# Patient Record
Sex: FEMALE | URBAN - METROPOLITAN AREA
[De-identification: names, ages, dates, MRNs, and addresses within clinical notes are randomized per-mention and may not be internally consistent; named-entity substitution may affect disease eponyms.]

---

## 2017-07-06 ENCOUNTER — INPATIENT (INPATIENT)
Facility: HOSPITAL | Age: 82
LOS: 4 days | Discharge: HOME | DRG: 56 | End: 2017-07-11
Attending: INTERNAL MEDICINE | Admitting: INTERNAL MEDICINE
Payer: MEDICARE

## 2017-07-06 VITALS
RESPIRATION RATE: 17 BRPM | WEIGHT: 139.99 LBS | SYSTOLIC BLOOD PRESSURE: 121 MMHG | HEIGHT: 64 IN | OXYGEN SATURATION: 97 % | HEART RATE: 86 BPM | TEMPERATURE: 98 F | DIASTOLIC BLOOD PRESSURE: 73 MMHG

## 2017-07-06 PROCEDURE — 70450 CT HEAD/BRAIN W/O DYE: CPT | Mod: 26

## 2017-07-06 PROCEDURE — 71010: CPT | Mod: 26

## 2017-07-06 NOTE — ED PROVIDER NOTE - MEDICAL DECISION MAKING DETAILS
wandering behavior, no trauma or infections, normal exam,  reports pt has wandered several times in past, has applied for aid at home in the past and did not qualify. spoke with Molly from social work team, pt not a safe discharge home. will admit for possible placement vs aid at home.  contact info [phone] Adenike Draper

## 2017-07-06 NOTE — ED PROVIDER NOTE - OBJECTIVE STATEMENT
Physician as . 81 y/o F pt with no known PMHx and no known PSHx BIB Police to ED with AMS s/p being [place] the side of a [place] today. Pt reports R ear discomfort in ED. Pt denies fever, chills, or any other complaints. NKDA.

## 2017-07-06 NOTE — ED PROVIDER NOTE - CONDUCTED A DETAILED DISCUSSION WITH PATIENT AND/OR GUARDIAN REGARDING, MDM
lab results/need for outpatient follow-up/radiology results lab results/radiology results/need to admit

## 2017-07-06 NOTE — ED ADULT TRIAGE NOTE - CHIEF COMPLAINT QUOTE
BIBA patient found wandering on [address] near [facility]. As per EMS [employer] was notified. Patient on 1:1 roam alert and yellow gown until seen by er attending

## 2017-07-07 DIAGNOSIS — G93.40 ENCEPHALOPATHY, UNSPECIFIED: ICD-10-CM

## 2017-07-07 DIAGNOSIS — Z91.83 WANDERING IN DISEASES CLASSIFIED ELSEWHERE: ICD-10-CM

## 2017-07-07 DIAGNOSIS — F03.90 UNSPECIFIED DEMENTIA WITHOUT BEHAVIORAL DISTURBANCE: ICD-10-CM

## 2017-07-07 DIAGNOSIS — Z29.9 ENCOUNTER FOR PROPHYLACTIC MEASURES, UNSPECIFIED: ICD-10-CM

## 2017-07-07 LAB
24R-OH-CALCIDIOL SERPL-MCNC: 47.7 NG/ML — SIGNIFICANT CHANGE UP (ref 30–100)
ALBUMIN SERPL ELPH-MCNC: 3.9 G/DL — SIGNIFICANT CHANGE UP (ref 3.5–5)
ALP SERPL-CCNC: 106 U/L — SIGNIFICANT CHANGE UP (ref 40–120)
ALT FLD-CCNC: 15 U/L DA — SIGNIFICANT CHANGE UP (ref 10–60)
ANION GAP SERPL CALC-SCNC: 8 MMOL/L — SIGNIFICANT CHANGE UP (ref 5–17)
AST SERPL-CCNC: 14 U/L — SIGNIFICANT CHANGE UP (ref 10–40)
BILIRUB SERPL-MCNC: 0.7 MG/DL — SIGNIFICANT CHANGE UP (ref 0.2–1.2)
BUN SERPL-MCNC: 13 MG/DL — SIGNIFICANT CHANGE UP (ref 7–18)
CALCIUM SERPL-MCNC: 8.8 MG/DL — SIGNIFICANT CHANGE UP (ref 8.4–10.5)
CHLORIDE SERPL-SCNC: 104 MMOL/L — SIGNIFICANT CHANGE UP (ref 96–108)
CO2 SERPL-SCNC: 29 MMOL/L — SIGNIFICANT CHANGE UP (ref 22–31)
CREAT SERPL-MCNC: 0.78 MG/DL — SIGNIFICANT CHANGE UP (ref 0.5–1.3)
FOLATE SERPL-MCNC: 10.9 NG/ML — SIGNIFICANT CHANGE UP (ref 4.8–24.2)
GLUCOSE SERPL-MCNC: 93 MG/DL — SIGNIFICANT CHANGE UP (ref 70–99)
HBA1C BLD-MCNC: 5.9 % — HIGH (ref 4–5.6)
HCT VFR BLD CALC: 38.2 % — SIGNIFICANT CHANGE UP (ref 34.5–45)
HGB BLD-MCNC: 12.7 G/DL — SIGNIFICANT CHANGE UP (ref 11.5–15.5)
MCHC RBC-ENTMCNC: 29.9 PG — SIGNIFICANT CHANGE UP (ref 27–34)
MCHC RBC-ENTMCNC: 33.4 GM/DL — SIGNIFICANT CHANGE UP (ref 32–36)
MCV RBC AUTO: 89.5 FL — SIGNIFICANT CHANGE UP (ref 80–100)
PLATELET # BLD AUTO: 260 K/UL — SIGNIFICANT CHANGE UP (ref 150–400)
POTASSIUM SERPL-MCNC: 4.3 MMOL/L — SIGNIFICANT CHANGE UP (ref 3.5–5.3)
POTASSIUM SERPL-SCNC: 4.3 MMOL/L — SIGNIFICANT CHANGE UP (ref 3.5–5.3)
PROT SERPL-MCNC: 7.7 G/DL — SIGNIFICANT CHANGE UP (ref 6–8.3)
RBC # BLD: 4.26 M/UL — SIGNIFICANT CHANGE UP (ref 3.8–5.2)
RBC # FLD: 12.8 % — SIGNIFICANT CHANGE UP (ref 10.3–14.5)
SODIUM SERPL-SCNC: 141 MMOL/L — SIGNIFICANT CHANGE UP (ref 135–145)
TSH SERPL-MCNC: 2.51 UU/ML — SIGNIFICANT CHANGE UP (ref 0.34–4.82)
VIT B12 SERPL-MCNC: 1472 PG/ML — HIGH (ref 243–894)
WBC # BLD: 8.8 K/UL — SIGNIFICANT CHANGE UP (ref 3.8–10.5)
WBC # FLD AUTO: 8.8 K/UL — SIGNIFICANT CHANGE UP (ref 3.8–10.5)

## 2017-07-07 PROCEDURE — 99222 1ST HOSP IP/OBS MODERATE 55: CPT

## 2017-07-07 PROCEDURE — 99284 EMERGENCY DEPT VISIT MOD MDM: CPT

## 2017-07-07 RX ORDER — HALOPERIDOL DECANOATE 100 MG/ML
1 INJECTION INTRAMUSCULAR EVERY 12 HOURS
Qty: 0 | Refills: 0 | Status: DISCONTINUED | OUTPATIENT
Start: 2017-07-07 | End: 2017-07-08

## 2017-07-07 RX ORDER — HALOPERIDOL DECANOATE 100 MG/ML
1 INJECTION INTRAMUSCULAR ONCE
Qty: 0 | Refills: 0 | Status: COMPLETED | OUTPATIENT
Start: 2017-07-07 | End: 2017-07-07

## 2017-07-07 RX ADMIN — Medication 2 MILLIGRAM(S): at 06:55

## 2017-07-07 RX ADMIN — HALOPERIDOL DECANOATE 1 MILLIGRAM(S): 100 INJECTION INTRAMUSCULAR at 05:15

## 2017-07-07 NOTE — H&P ADULT - PROBLEM SELECTOR PLAN 1
patient found wandering on the street  as per ED attending, family was at bedside however on admission, no one at bedside and unable to contact family via phone  AAOx1, not agitated   consult for placement  primary team to get medication list from family  fall risk protocol  dementia work up- tsh, b12, folate patient found wandering on the street  as per ED attending, family was at bedside however on admission, no one at bedside and unable to contact family via phone  AAOx1, not agitated   consult for placement  primary team to get medication list from family  fall risk protocol  dementia work up- tsh, b12, folate  head CT neg  f/u UA patient found wandering on the street  as per ED attending, family was at bedside however on admission, no one at bedside and unable to contact family via phone  AAOx1, not agitated   consult for placement  primary team to get medication list from family  fall risk protocol  dementia work up- tsh, b12, folate  head CT neg  f/u UA  Dr. Wyatt - neuro  Dr. Cutler- psych

## 2017-07-07 NOTE — ED ADULT NURSE NOTE - CHIEF COMPLAINT QUOTE
BIBA patient found wandering on [address] near [facility]. As per EMS [employer] was notified. Patient on 1:1 roam alert and yellow gown until seen by er attending

## 2017-07-07 NOTE — H&P ADULT - NSHPLABSRESULTS_GEN_ALL_CORE
LABS:                        12.7   8.8   )-----------( 260      ( 07 Jul 2017 00:18 )             38.2     07-07    141  |  104  |  13  ----------------------------<  93  4.3   |  29  |  0.78    Ca    8.8      07 Jul 2017 00:19    TPro  7.7  /  Alb  3.9  /  TBili  0.7  /  DBili  x   /  AST  14  /  ALT  15  /  AlkPhos  106  07-07    < from: CT Head No Cont (07.06.17 @ 23:24) >    EXAM:  CT BRAIN                            PROCEDURE DATE:  07/06/2017          INTERPRETATION:  CT HEAD WITHOUT CONTRAST    INDICATION: Confusion.     TECHNIQUE: Noncontrast routine head CT.    COMPARISON: None.    FINDINGS:    Brain: No hemorrhage. No mass effect or edema. No midline shift.   Parenchyma and sulci are age-appropriate.  Ventricles: No evidence of hydrocephalus.    Bones and soft tissues: No acute calvarial fracture.  Sinuses and mastoids: Unremarkable.    IMPRESSION:    No acute intracranial pathology.    < end of copied text >

## 2017-07-07 NOTE — H&P ADULT - HISTORY OF PRESENT ILLNESS
Patient is an 82F with unknown PMH (as per outpatient med rec- unknown pharmacy- has been on several dementia medications) found by the police on the street wandering. As per ED attending, family had come to visit her, however upon admission, no family at bedside. Called spouse Casa and left message, no reply. Patient was seen and examined, AAOx1 only, does not know place, time or year. Denies any pain or discomfort but was frustrated and confused when speaking to . Primary team to follow up regarding patient's home medications for underlying dementia.

## 2017-07-07 NOTE — H&P ADULT - ASSESSMENT
Patient is an 82F with unknown PMH (as per outpatient med rec- unknown pharmacy- has been on several dementia medications) found by the police on the street wandering. Admitted for placement.

## 2017-07-07 NOTE — PROGRESS NOTE ADULT - SUBJECTIVE AND OBJECTIVE BOX
PGY 1 Note discussed with supervising resident and primary attending    Patient is a 82y old  Female who presents with a chief complaint of found wandering in the street (07 Jul 2017 03:17)      INTERVAL HPI/OVERNIGHT EVENTS: Patient was seen at the bedside. patient was sleeping when I went to examine. As per night staffs patient was agitated and was trying to terry house staff. Patient was given halodol which calmed the patient.     MEDICATIONS  (STANDING):    MEDICATIONS  (PRN):  haloperidol    Injectable 1 milliGRAM(s) IV Push every 12 hours PRN agitation      __________________________________________________  REVIEW OF SYSTEMS:    CONSTITUTIONAL: No fever,   EYES: no acute visual disturbances  NECK: No pain or stiffness  RESPIRATORY: No cough; No shortness of breath  CARDIOVASCULAR: No chest pain, no palpitations  GASTROINTESTINAL: No pain. No nausea or vomiting; No diarrhea   NEUROLOGICAL: No headache or numbness, no tremors  MUSCULOSKELETAL: No joint pain, no muscle pain  GENITOURINARY: no dysuria, no frequency, no hesitancy  PSYCHIATRY: no depression , no anxiety  ALL OTHER  ROS negative        Vital Signs Last 24 Hrs  T(C): 36.3 (07 Jul 2017 14:32), Max: 36.8 (06 Jul 2017 21:32)  T(F): 97.3 (07 Jul 2017 14:32), Max: 98.2 (06 Jul 2017 21:32)  HR: 81 (07 Jul 2017 14:32) (76 - 91)  BP: 135/64 (07 Jul 2017 14:32) (121/73 - 175/83)  BP(mean): --  RR: 16 (07 Jul 2017 14:32) (16 - 18)  SpO2: 98% (07 Jul 2017 14:32) (96% - 98%)    ________________________________________________  PHYSICAL EXAM:  GENERAL: NAD  HEENT: Normocephalic;  conjunctivae and sclerae clear; moist mucous membranes;   NECK : supple  CHEST/LUNG: Clear to auscultation bilaterally with good air entry   HEART: S1 S2  regular; no murmurs, gallops or rubs  ABDOMEN: Soft, Nontender, Nondistended; Bowel sounds present  EXTREMITIES: no cyanosis; no edema; no calf tenderness  SKIN: warm and dry; no rash  NERVOUS SYSTEM:  Awake and alert; Oriented  to place, person and time ; no new deficits    _________________________________________________  LABS:                        12.7   8.8   )-----------( 260      ( 07 Jul 2017 00:18 )             38.2     07-07    141  |  104  |  13  ----------------------------<  93  4.3   |  29  |  0.78    Ca    8.8      07 Jul 2017 00:19    TPro  7.7  /  Alb  3.9  /  TBili  0.7  /  DBili  x   /  AST  14  /  ALT  15  /  AlkPhos  106  07-07        CAPILLARY BLOOD GLUCOSE            RADIOLOGY & ADDITIONAL TESTS:    Imaging Personally Reviewed:  YES/NO    Consultant(s) Notes Reviewed:   YES/ No    Care Discussed with Consultants :     Plan of care was discussed with patient and /or primary care giver; all questions and concerns were addressed and care was aligned with patient's wishes.

## 2017-07-07 NOTE — CONSULT NOTE ADULT - ASSESSMENT
chest UA, Utox, RPR Encephalopathy, possible due to dementia with likely superimposed delirium  check UA, Utox, RPR  restart home dementia medications  move by window with curtains open during the day and closed at night with frequent reorientation to minimize delerium  avoid sedating medications  outpatient neuro fu and outpatient evaluation for dementia

## 2017-07-07 NOTE — PROGRESS NOTE ADULT - SUBJECTIVE AND OBJECTIVE BOX
CHIEF COMPLAINT:Patient is a 82y old  Female who presents with a chief complaint of found wandering in the street (07 Jul 2017 03:17)  h/o severe dementia was [place]. 	          REVIEW OF SYSTEMS:  CONSTITUTIONAL: No fever, weight loss, or fatigue  EYES: No eye pain, visual disturbances, or discharge  NECK: No pain or stiffness  RESPIRATORY: No cough, wheezing, chills or hemoptysis; No Shortness of Breath  CARDIOVASCULAR: No chest pain, palpitations, passing out, dizziness, or leg swelling  GASTROINTESTINAL: No abdominal or epigastric pain. No nausea, vomiting, or hematemesis; No diarrhea or constipation. No melena or hematochezia.  GENITOURINARY: No dysuria, frequency, hematuria, or incontinence  NEUROLOGICAL: No headaches, memory loss, loss of strength, numbness, or tremors  SKIN: No itching, burning, rashes, or lesions   LYMPH Nodes: No enlarged glands  ENDOCRINE: No heat or cold intolerance; No hair loss  MUSCULOSKELETAL: No joint pain or swelling; No muscle, back, or extremity pain    Medications:  MEDICATIONS  (STANDING):    MEDICATIONS  (PRN):  haloperidol    Injectable 1 milliGRAM(s) IV Push every 12 hours PRN agitation    	    PHYSICAL EXAM:  T(C): 36.3 (07-07-17 @ 14:32), Max: 36.8 (07-06-17 @ 21:32)  HR: 81 (07-07-17 @ 14:32) (76 - 91)  BP: 135/64 (07-07-17 @ 14:32) (121/73 - 175/83)  RR: 16 (07-07-17 @ 14:32) (16 - 18)  SpO2: 98% (07-07-17 @ 14:32) (96% - 98%)  Wt(kg): --  I&O's Summary      Appearance: Normal	  HEENT:   Normal oral mucosa, PERRL, EOMI	  Lymphatic: No lymphadenopathy  Cardiovascular: Normal S1 S2, No JVD, No murmurs, No edema  Respiratory: Lungs clear to auscultation	  Psychiatry: A & O x 3, Mood & affect appropriate  Gastrointestinal:  Soft, Non-tender, + BS	  Skin: No rashes, No ecchymoses, No cyanosis	  Neurologic: Non-focal  Extremities: Normal range of motion, No clubbing, cyanosis or edema  Vascular: Peripheral pulses palpable 2+ bilaterally    TELEMETRY: 	    ECG:  	  RADIOLOGY:  OTHER: 	  	  LABS:	 	    CARDIAC MARKERS:                                12.7   8.8   )-----------( 260      ( 07 Jul 2017 00:18 )             38.2     07-07    141  |  104  |  13  ----------------------------<  93  4.3   |  29  |  0.78    Ca    8.8      07 Jul 2017 00:19    TPro  7.7  /  Alb  3.9  /  TBili  0.7  /  DBili  x   /  AST  14  /  ALT  15  /  AlkPhos  106  07-07    proBNP:   Lipid Profile:   HgA1c: Hemoglobin A1C, Whole Blood: 5.9 % (07-07 @ 09:09)    TSH: Thyroid Stimulating Hormone, Serum: 2.51 uU/mL (07-07 @ 06:41)

## 2017-07-07 NOTE — CONSULT NOTE ADULT - SUBJECTIVE AND OBJECTIVE BOX
Patient is a 82y old  Female who presents with a chief complaint of found wandering in the street (07 Jul 2017 03:17)      HPI:  Patient is an 82F with unknown PMH (as per outpatient med rec- unknown pharmacy- has been on several dementia medications) found by the police on the street wandering. As per ED attending, family had come to visit her, however upon admission, no family at bedside. Called spouse Casa and left message, no reply. Patient was seen and examined, AAOx1 only, does not know place, time or year. Denies any pain or discomfort but was frustrated and confused when speaking to . Primary team to follow up regarding patient's home medications for underlying dementia. (07 Jul 2017 03:17)         Neurological Review of Systems:  No difficulty with language.  No vision loss or double vision.  No dizziness, vertigo or new hearing loss.  No difficulty with speech or swallowing.  No focal weakness.  No focal sensory changes.  No numbness or tingling in the bilateral lower extremities.  No difficulty with balance.  No difficulty with ambulation.        MEDICATIONS  (STANDING):    MEDICATIONS  (PRN):  haloperidol    Injectable 1 milliGRAM(s) IV Push every 12 hours PRN agitation    Allergies    No Known Allergies    Intolerances      PAST MEDICAL & SURGICAL HISTORY:  No pertinent past medical history  No significant past surgical history    FAMILY HISTORY:  No pertinent family history in first degree relatives    SOCIAL HISTORY: non smoker/ former smoker/ active smoker    Review of Systems:  Constitutional: No generalized weakness. No fevers or chills.                    Eyes, Ears, Mouth, Throat: No vision loss   Respiratory: No shortness of breath or cough.                                Cardiovascular: No chest pain or palpitations  Gastrointestinal: No nausea or vomiting.                                         Genitourinary: No urinary incontinence or burning on urination.  Musculoskeletal: No joint pain.                                                           Dermatologic: No rash.  Neurological: as per HPI                                                                      Psychiatric: No behavioral problems.  Endocrine: No known hypoglycemia.               Hematologic/Lymphatic: No easy bleeding.    O:  Vital Signs Last 24 Hrs  T(C): 36.3 (07 Jul 2017 14:32), Max: 36.8 (06 Jul 2017 21:32)  T(F): 97.3 (07 Jul 2017 14:32), Max: 98.2 (06 Jul 2017 21:32)  HR: 81 (07 Jul 2017 14:32) (76 - 91)  BP: 135/64 (07 Jul 2017 14:32) (121/73 - 175/83)  BP(mean): --  RR: 16 (07 Jul 2017 14:32) (16 - 18)  SpO2: 98% (07 Jul 2017 14:32) (96% - 98%)    General Exam:   General appearance: No acute distress                 Cardiovascular: Pedal dorsalis pulses intact bilaterally    Mental Status: Orientated to self, date and place.  Attention intact.  No dysarthria, aphasia or neglect.  Knowledge intact.  Registration intact.  Short and long term memory grossly intact.      Cranial Nerves: CN I - not tested.  PERRL, EOMI, VFF, no nystagmus or diplopia.  No APD.  Fundi not visualized.  CN V1-3 intact to light touch and pinprick.  No facial asymmetry.  Hearing intact to finger rub bilaterally.  Tongue, uvula and palate midline.  Sternocleidomastoid and Trapezius intact bilaterally.    Motor:   Tone: normal.                  Strength intact throughout  No pronator drift bilaterally                      No dysmetria on finger-nose-finger or heel-shin-heel  No truncal ataxia.  No resting, postural or action tremor.  No myoclonus.    Sensation: intact to light touch, pinprick, vibration and proprioception    Deep Tendon Reflexes: 1+ bilateral biceps, triceps, brachioradialis, knee and ankle  Toes flexor bilaterally    Gait: normal and stable.  Rhomberg -felipa.    Other:     LABS:                        12.7   8.8   )-----------( 260      ( 07 Jul 2017 00:18 )             38.2     07-07    141  |  104  |  13  ----------------------------<  93  4.3   |  29  |  0.78    Ca    8.8      07 Jul 2017 00:19    TPro  7.7  /  Alb  3.9  /  TBili  0.7  /  DBili  x   /  AST  14  /  ALT  15  /  AlkPhos  106  07-07      Vitamin B12, Serum (07.07.17 @ 09:06)    Vitamin B12, Serum: 1472 pg/mL    Folate, Serum (07.07.17 @ 09:06)    Folate, Serum: 10.9 ng/mL            RADIOLOGY & ADDITIONAL STUDIES:    Hemoglobin A1C, Whole Blood (07.07.17 @ 09:09)    Hemoglobin A1C, Whole Blood: 5.9: Method: Immunoassay       Reference Range                4.0-5.6%       High risk (prediabetic)        5.7-6.4%       Diabetic, diagnostic             >=6.5%       ADA diabetic treatment goal       <7.0%  The Hemoglobin A1c reference ranges are based5.9: on the 2010 recommendations  Thyroid Stimulating Hormone, Serum (07.07.17 @ 06:41)    Thyroid Stimulating Hormone, Serum: 2.51 uU/mL        EKG:    CTH (images reviewed) < from: CT Head No Cont (07.06.17 @ 23:24) >    FINDINGS:    Brain: No hemorrhage. No mass effect or edema. No midline shift.   Parenchyma and sulci are age-appropriate.  Ventricles: No evidence of hydrocephalus.    Bones and soft tissues: No acute calvarial fracture.  Sinuses and mastoids: Unremarkable.    IMPRESSION:    No acute intracranial pathology.    < end of copied text >    < from: Xray Chest 1 View AP-PORTABLE IMMEDIATE (07.06.17 @ 22:50) >  NTERPRETATION:  Portable chest x-ray    Clinical Indication: Confusion. screening for infection    Comparison: None    There is no acute pulmonary infiltrate, pleural effusion, or   pneumothorax. The trachea is midline. The cardiac silhouette is within   normal limits. No pulmonary vascular congestion.    Impression: No radiographic evidence for acute cardiopulmonary disease.    < end of copied text > Patient unable to give history, information obtained from the chart and aid at bedside.    HPI:  Patient is an 82F with unknown PMH (as per outpatient med rec- unknown pharmacy- has been on several dementia medications) found by the police on the street wandering. The patient is unable to give history and further history is currently non able to be obtained.  The patient was agitated earlier as per aid at bedside and received sedating medications.      MEDICATIONS  (STANDING):    MEDICATIONS  (PRN):  haloperidol    Injectable 1 milliGRAM(s) IV Push every 12 hours PRN agitation    Allergies    No Known Allergies    Intolerances      PAST MEDICAL & SURGICAL HISTORY:  No pertinent past medical history  No significant past surgical history    FAMILY HISTORY:  No pertinent family history in first degree relatives    SOCIAL HISTORY: smoking status unknown    Review of Systems:  Constitutional:No fevers                 Eyes, Ears, Mouth, Throat: unable to obtain   Respiratory: No cough.                                Cardiovascular:  unable to obtain   Gastrointestinal: No vomiting.                                         Genitourinary:  unable to obtain   Musculoskeletal:  unable to obtain                                                       Dermatologic: No rash.  Neurological: as per HPI                                                                      Psychiatric: + behavioral problems.  Endocrine: No hypoglycemia in hospital        Hematologic/Lymphatic:  unable to obtain     O:  Vital Signs Last 24 Hrs  T(C): 36.3 (07 Jul 2017 14:32), Max: 36.8 (06 Jul 2017 21:32)  T(F): 97.3 (07 Jul 2017 14:32), Max: 98.2 (06 Jul 2017 21:32)  HR: 81 (07 Jul 2017 14:32) (76 - 91)  BP: 135/64 (07 Jul 2017 14:32) (121/73 - 175/83)  BP(mean): --  RR: 16 (07 Jul 2017 14:32) (16 - 18)  SpO2: 98% (07 Jul 2017 14:32) (96% - 98%)    General Exam:   General appearance: No acute distress                 Cardiovascular: Pedal dorsalis pulses intact bilaterally    Mental Status: Orientated to self but  not to date and place.  Attention decreased.  dysarthria, aphasia or neglect unrleibale.  Patient follows only 1 request, show me your tongue.  Knowledge, Registration unrleibale.  Short and long term memory:  unrelible.    Cranial Nerves: CN I - not tested.  PERRL, EOMI, blinks  to  threat bl temporal area.  Fundi not visualized.  CN V1-3 unreliable.  No facial asymmetry.  Hearing intact to speech bilaterally.  Tongue, uvula and palate midline.  Sternocleidomastoid and Trapezius grossly intact.    Motor:   tone: normal  moved all 4 ext equally  Unable to asses for dysmetria as patient not following requests.  No truncal ataxia.  No resting, postural or action tremor.  No myoclonus.    Sensation: intact to pain all 4 exr    Deep Tendon Reflexes: 1+ bilateral biceps, triceps, brachioradialis, knee and ankle  Toes flexor bilaterally    Gait: patient unsafe to walk    Other:     LABS:                        12.7   8.8   )-----------( 260      ( 07 Jul 2017 00:18 )             38.2     07-07    141  |  104  |  13  ----------------------------<  93  4.3   |  29  |  0.78    Ca    8.8      07 Jul 2017 00:19    TPro  7.7  /  Alb  3.9  /  TBili  0.7  /  DBili  x   /  AST  14  /  ALT  15  /  AlkPhos  106  07-07      Vitamin B12, Serum (07.07.17 @ 09:06)    Vitamin B12, Serum: 1472 pg/mL    Folate, Serum (07.07.17 @ 09:06)    Folate, Serum: 10.9 ng/mL            RADIOLOGY & ADDITIONAL STUDIES:    Hemoglobin A1C, Whole Blood (07.07.17 @ 09:09)    Hemoglobin A1C, Whole Blood: 5.9: Method: Immunoassay       Reference Range                4.0-5.6%       High risk (prediabetic)        5.7-6.4%       Diabetic, diagnostic             >=6.5%       ADA diabetic treatment goal       <7.0%  The Hemoglobin A1c reference ranges are based5.9: on the 2010 recommendations  Thyroid Stimulating Hormone, Serum (07.07.17 @ 06:41)    Thyroid Stimulating Hormone, Serum: 2.51 uU/mL        EKG:    CTH (images reviewed) < from: CT Head No Cont (07.06.17 @ 23:24) >    FINDINGS:    Brain: No hemorrhage. No mass effect or edema. No midline shift.   Parenchyma and sulci are age-appropriate.  Ventricles: No evidence of hydrocephalus.    Bones and soft tissues: No acute calvarial fracture.  Sinuses and mastoids: Unremarkable.    IMPRESSION:    No acute intracranial pathology.    < end of copied text >    < from: Xray Chest 1 View AP-PORTABLE IMMEDIATE (07.06.17 @ 22:50) >  NTERPRETATION:  Portable chest x-ray    Clinical Indication: Confusion. screening for infection    Comparison: None    There is no acute pulmonary infiltrate, pleural effusion, or   pneumothorax. The trachea is midline. The cardiac silhouette is within   normal limits. No pulmonary vascular congestion.    Impression: No radiographic evidence for acute cardiopulmonary disease.    < end of copied text > Patient unable to give history, information obtained from the chart and aid at bedside.    HPI:  Patient is an 82F with unknown PMH (as per outpatient med rec- unknown pharmacy- has been on several dementia medications) found by the police on the street wandering. The patient is unable to give history and further history is currently non able to be obtained.  The patient was agitated earlier as per aid at bedside and received sedating medications.      MEDICATIONS  (STANDING):    MEDICATIONS  (PRN):  haloperidol    Injectable 1 milliGRAM(s) IV Push every 12 hours PRN agitation    Allergies    No Known Allergies    Intolerances      PAST MEDICAL & SURGICAL HISTORY:  No pertinent past medical history  No significant past surgical history    FAMILY HISTORY:  No pertinent family history in first degree relatives    SOCIAL HISTORY: smoking status unknown    Review of Systems:  Constitutional:No fevers                 Eyes, Ears, Mouth, Throat: unable to obtain   Respiratory: No cough.                                Cardiovascular:  unable to obtain   Gastrointestinal: No vomiting.                                         Genitourinary:  unable to obtain   Musculoskeletal:  unable to obtain                                                       Dermatologic: No rash.  Neurological: as per HPI                                                                      Psychiatric: + behavioral problems.  Endocrine: No hypoglycemia in hospital        Hematologic/Lymphatic:  unable to obtain     O:  Vital Signs Last 24 Hrs  T(C): 36.3 (07 Jul 2017 14:32), Max: 36.8 (06 Jul 2017 21:32)  T(F): 97.3 (07 Jul 2017 14:32), Max: 98.2 (06 Jul 2017 21:32)  HR: 81 (07 Jul 2017 14:32) (76 - 91)  BP: 135/64 (07 Jul 2017 14:32) (121/73 - 175/83)  BP(mean): --  RR: 16 (07 Jul 2017 14:32) (16 - 18)  SpO2: 98% (07 Jul 2017 14:32) (96% - 98%)    General Exam:   General appearance: No acute distress                 Cardiovascular: Pedal dorsalis pulses intact bilaterally    Mental Status: Orientated to self but  not to date and place.  Attention decreased.  dysarthria, aphasia or neglect unrleibale.  Patient follows only 1 request, show me your tongue.  Knowledge, Registration unrleibale.  Short and long term memory:  unrelible.    Cranial Nerves: CN I - not tested.  PERRL, EOMI, blinks  to  threat bl temporal area.  Fundi not visualized.  CN V1-3 unreliable.  No facial asymmetry.  Hearing intact to speech bilaterally.  Tongue, uvula and palate midline.  Sternocleidomastoid and Trapezius grossly intact.    Motor:   tone: normal  moved all 4 ext equally  Unable to asses for dysmetria as patient not following requests.  No truncal ataxia.  No resting, postural or action tremor.  No myoclonus.    Sensation: intact to pain all 4 exr    Deep Tendon Reflexes: 1+ bilateral biceps, triceps, brachioradialis, knee and ankle  Toes flexor bilaterally    Gait: patient unsafe to walk    Other:     LABS:                        12.7   8.8   )-----------( 260      ( 07 Jul 2017 00:18 )             38.2     07-07    141  |  104  |  13  ----------------------------<  93  4.3   |  29  |  0.78    Ca    8.8      07 Jul 2017 00:19    TPro  7.7  /  Alb  3.9  /  TBili  0.7  /  DBili  x   /  AST  14  /  ALT  15  /  AlkPhos  106  07-07      Vitamin B12, Serum (07.07.17 @ 09:06)    Vitamin B12, Serum: 1472 pg/mL    Folate, Serum (07.07.17 @ 09:06)    Folate, Serum: 10.9 ng/mL            RADIOLOGY & ADDITIONAL STUDIES:    Hemoglobin A1C, Whole Blood (07.07.17 @ 09:09)    Hemoglobin A1C, Whole Blood: 5.9: Method: Immunoassay       Reference Range                4.0-5.6%       High risk (prediabetic)        5.7-6.4%       Diabetic, diagnostic             >=6.5%       ADA diabetic treatment goal       <7.0%  The Hemoglobin A1c reference ranges are based5.9: on the 2010 recommendations  Thyroid Stimulating Hormone, Serum (07.07.17 @ 06:41)    Thyroid Stimulating Hormone, Serum: 2.51 uU/mL      CTH (images reviewed) < from: CT Head No Cont (07.06.17 @ 23:24) >    FINDINGS:    Brain: No hemorrhage. No mass effect or edema. No midline shift.   Parenchyma and sulci are age-appropriate.  Ventricles: No evidence of hydrocephalus.    Bones and soft tissues: No acute calvarial fracture.  Sinuses and mastoids: Unremarkable.    IMPRESSION:    No acute intracranial pathology.    < end of copied text >    < from: Xray Chest 1 View AP-PORTABLE IMMEDIATE (07.06.17 @ 22:50) >  NTERPRETATION:  Portable chest x-ray    Clinical Indication: Confusion. screening for infection    Comparison: None    There is no acute pulmonary infiltrate, pleural effusion, or   pneumothorax. The trachea is midline. The cardiac silhouette is within   normal limits. No pulmonary vascular congestion.    Impression: No radiographic evidence for acute cardiopulmonary disease.    < end of copied text >    PMD note appreciated

## 2017-07-07 NOTE — ED ADULT NURSE NOTE - OBJECTIVE STATEMENT
patient found wandering on [address] near [facility]. As per EMS [employer] was notified. Patient on 1:1 roam alert and yellow gown until seen by er attending

## 2017-07-07 NOTE — PROGRESS NOTE ADULT - ASSESSMENT
Patient is an 82F with unknown PMH (as per outpatient med rec- unknown pharmacy- has been on several dementia medications) found by the police on the street wandering. The patient is unable to give history and further history is currently non able to be obtained.  The patient was agitated earlier as per aid at bedside and received sedating medications. Patient tsh, CT head and vit b12 are WNL. patient was seen by neurology. treatment will be as follows.

## 2017-07-08 DIAGNOSIS — G30.1 ALZHEIMER'S DISEASE WITH LATE ONSET: ICD-10-CM

## 2017-07-08 DIAGNOSIS — I71.4 ABDOMINAL AORTIC ANEURYSM, WITHOUT RUPTURE: ICD-10-CM

## 2017-07-08 DIAGNOSIS — R31.9 HEMATURIA, UNSPECIFIED: ICD-10-CM

## 2017-07-08 LAB
APPEARANCE UR: CLEAR — SIGNIFICANT CHANGE UP
BILIRUB UR-MCNC: NEGATIVE — SIGNIFICANT CHANGE UP
COLOR SPEC: YELLOW — SIGNIFICANT CHANGE UP
DIFF PNL FLD: ABNORMAL
GLUCOSE UR QL: NEGATIVE — SIGNIFICANT CHANGE UP
KETONES UR-MCNC: ABNORMAL
LEUKOCYTE ESTERASE UR-ACNC: ABNORMAL
NITRITE UR-MCNC: NEGATIVE — SIGNIFICANT CHANGE UP
PCP SPEC-MCNC: SIGNIFICANT CHANGE UP
PH UR: 5 — SIGNIFICANT CHANGE UP (ref 5–8)
PROT UR-MCNC: 15
SP GR SPEC: 1.02 — SIGNIFICANT CHANGE UP (ref 1.01–1.02)
UROBILINOGEN FLD QL: NEGATIVE — SIGNIFICANT CHANGE UP

## 2017-07-08 RX ORDER — PREGABALIN 225 MG/1
500 CAPSULE ORAL DAILY
Qty: 0 | Refills: 0 | Status: DISCONTINUED | OUTPATIENT
Start: 2017-07-08 | End: 2017-07-11

## 2017-07-08 RX ORDER — HALOPERIDOL DECANOATE 100 MG/ML
1 INJECTION INTRAMUSCULAR EVERY 6 HOURS
Qty: 0 | Refills: 0 | Status: DISCONTINUED | OUTPATIENT
Start: 2017-07-08 | End: 2017-07-08

## 2017-07-08 RX ORDER — QUETIAPINE FUMARATE 200 MG/1
25 TABLET, FILM COATED ORAL
Qty: 0 | Refills: 0 | Status: DISCONTINUED | OUTPATIENT
Start: 2017-07-08 | End: 2017-07-08

## 2017-07-08 RX ORDER — QUETIAPINE FUMARATE 200 MG/1
25 TABLET, FILM COATED ORAL
Qty: 0 | Refills: 0 | Status: DISCONTINUED | OUTPATIENT
Start: 2017-07-08 | End: 2017-07-11

## 2017-07-08 RX ORDER — HALOPERIDOL DECANOATE 100 MG/ML
1 INJECTION INTRAMUSCULAR EVERY 6 HOURS
Qty: 0 | Refills: 0 | Status: DISCONTINUED | OUTPATIENT
Start: 2017-07-08 | End: 2017-07-11

## 2017-07-08 RX ADMIN — HALOPERIDOL DECANOATE 1 MILLIGRAM(S): 100 INJECTION INTRAMUSCULAR at 02:54

## 2017-07-08 RX ADMIN — QUETIAPINE FUMARATE 25 MILLIGRAM(S): 200 TABLET, FILM COATED ORAL at 15:37

## 2017-07-08 NOTE — BEHAVIORAL HEALTH ASSESSMENT NOTE - HPI (INCLUDE ILLNESS QUALITY, SEVERITY, DURATION, TIMING, CONTEXT, MODIFYING FACTORS, ASSOCIATED SIGNS AND SYMPTOMS)
tried to see patient [id number] but was unable to as pt. was sedated from haldol 1mg, ativan 2 mg stat dose received earlier yesterday. as per report pt. was agitated and received haldol 1mg with poor response. pt. seen with morgan jalloh  who stated her mother always had difficulty focusing but her memory got worst over past 1 year and was seen by neuro who recommended namenda but pt. did not take it. pt. lives with her  and he is the sole care giver-makes food, help with shower, adl. pt. reportedly was not sleeping well at night and she wandered which led to her hospitalization. no inpt. psych admission. no substance abuse hx. pts mother had dementia. pt. is a poor historian due to dementia . pt. was uncooperative with exam. appears paranoid poor eye contact. unable to do further exam. safety issues dw daughter

## 2017-07-08 NOTE — PROGRESS NOTE ADULT - SUBJECTIVE AND OBJECTIVE BOX
PGY 1 Note discussed with supervising resident and primary attending    Patient is a 82y old  Female who presents with a chief complaint of found wandering in the street (2017 03:17). Patient is admitted here for advance dementia.       INTERVAL HPI/OVERNIGHT EVENTS: Patient was seen and examined at the bedside. Patient's agitation is controlled on medication.     MEDICATIONS  (STANDING):  cyanocobalamin 500 MICROGram(s) Oral daily  QUEtiapine 25 milliGRAM(s) Oral two times a day    MEDICATIONS  (PRN):  haloperidol    Injectable 1 milliGRAM(s) IntraMuscular every 6 hours PRN Agitation      __________________________________________________  REVIEW OF SYSTEMS:    CONSTITUTIONAL: No fever,   EYES: no acute visual disturbances  NECK: No pain or stiffness  RESPIRATORY: No cough; No shortness of breath  CARDIOVASCULAR: No chest pain, no palpitations  GASTROINTESTINAL: No pain. No nausea or vomiting; No diarrhea   NEUROLOGICAL: No headache or numbness, no tremors  MUSCULOSKELETAL: No joint pain, no muscle pain  GENITOURINARY: no dysuria, no frequency, no hesitancy  PSYCHIATRY: no depression , no anxiety  ALL OTHER  ROS negative        Vital Signs Last 24 Hrs  T(C): 36.7 (2017 05:50), Max: 36.7 (2017 05:50)  T(F): 98.1 (2017 05:50), Max: 98.1 (2017 05:50)  HR: 73 (2017 15:42) (73 - 89)  BP: 127/67 (2017 15:42) (127/67 - 137/65)  BP(mean): --  RR: 17 (2017 15:42) (17 - 17)  SpO2: 100% (2017 15:42) (98% - 100%)    ________________________________________________  PHYSICAL EXAM:  GENERAL: NAD  HEENT: Normocephalic;  conjunctivae and sclerae clear; moist mucous membranes;   NECK : supple  CHEST/LUNG: Clear to auscultation bilaterally with good air entry   HEART: S1 S2  regular; no murmurs, gallops or rubs  ABDOMEN: Soft, Nontender, Nondistended; Bowel sounds present  EXTREMITIES: no cyanosis; no edema; no calf tenderness  SKIN: warm and dry; no rash  NERVOUS SYSTEM:  Awake and alert; Oriented  to place, person and time ; no new deficits    _________________________________________________  LABS:                        12.7   8.8   )-----------( 260      ( 2017 00:18 )             38.2         141  |  104  |  13  ----------------------------<  93  4.3   |  29  |  0.78    Ca    8.8      2017 00:19    TPro  7.7  /  Alb  3.9  /  TBili  0.7  /  DBili  x   /  AST  14  /  ALT  15  /  AlkPhos  106  07-07      Urinalysis Basic - ( 2017 13:10 )    Color: Yellow / Appearance: Clear / S.025 / pH: x  Gluc: x / Ketone: Large  / Bili: Negative / Urobili: Negative   Blood: x / Protein: 15 / Nitrite: Negative   Leuk Esterase: Small / RBC: 0-2 /HPF / WBC 3-5 /HPF   Sq Epi: x / Non Sq Epi: Few / Bacteria: Trace /HPF      CAPILLARY BLOOD GLUCOSE            RADIOLOGY & ADDITIONAL TESTS:    Imaging Personally Reviewed:  YES/NO    Consultant(s) Notes Reviewed:   YES/ No    Care Discussed with Consultants :     Plan of care was discussed with patient and /or primary care giver; all questions and concerns were addressed and care was aligned with patient's wishes.

## 2017-07-08 NOTE — PROGRESS NOTE ADULT - SUBJECTIVE AND OBJECTIVE BOX
CHIEF COMPLAINT:Patient is a 82y old  Female who presents with a chief complaint of found wandering in the street (07 Jul 2017 03:17)  still confused  started on seraquel 25 bid  got one dose sleepy.  seen by psych  	          REVIEW OF SYSTEMS:  CONSTITUTIONAL: No fever, weight loss, or fatigue  EYES: No eye pain, visual disturbances, or discharge  NECK: No pain or stiffness  RESPIRATORY: No cough, wheezing, chills or hemoptysis; No Shortness of Breath  CARDIOVASCULAR: No chest pain, palpitations, passing out, dizziness, or leg swelling  GASTROINTESTINAL: No abdominal or epigastric pain. No nausea, vomiting, or hematemesis; No diarrhea or constipation. No melena or hematochezia.  GENITOURINARY: No dysuria, frequency, hematuria, or incontinence  NEUROLOGICAL: No headaches, memory loss, loss of strength, numbness, or tremors  SKIN: No itching, burning, rashes, or lesions   LYMPH Nodes: No enlarged glands  ENDOCRINE: No heat or cold intolerance; No hair loss  MUSCULOSKELETAL: No joint pain or swelling; No muscle, back, or extremity pain    Medications:  MEDICATIONS  (STANDING):  QUEtiapine 25 milliGRAM(s) Oral two times a day  cyanocobalamin 500 MICROGram(s) Oral daily    MEDICATIONS  (PRN):  haloperidol    Injectable 1 milliGRAM(s) IntraMuscular every 6 hours PRN Agitation    	    PHYSICAL EXAM:  T(C): 36.7 (07-08-17 @ 05:50), Max: 36.7 (07-08-17 @ 05:50)  HR: 73 (07-08-17 @ 15:42) (73 - 89)  BP: 127/67 (07-08-17 @ 15:42) (113/72 - 137/65)  RR: 17 (07-08-17 @ 15:42) (16 - 17)  SpO2: 100% (07-08-17 @ 15:42) (98% - 100%)  Wt(kg): --  I&O's Summary      Appearance: Normal	  HEENT:   Normal oral mucosa, PERRL, EOMI	  Lymphatic: No lymphadenopathy  Cardiovascular: Normal S1 S2, No JVD, No murmurs, No edema  Respiratory: Lungs clear to auscultation	  Psychiatry: A & O x 3, Mood & affect appropriate  Gastrointestinal:  Soft, Non-tender, + BS	  Skin: No rashes, No ecchymoses, No cyanosis	  Neurologic: advanced dementia  Extremities: Normal range of motion, No clubbing, cyanosis or edema  Vascular: Peripheral pulses palpable 2+ bilaterally    TELEMETRY: 	    ECG:  	  RADIOLOGY:  OTHER: 	  	  LABS:	 	    CARDIAC MARKERS:                                12.7   8.8   )-----------( 260      ( 07 Jul 2017 00:18 )             38.2     07-07    141  |  104  |  13  ----------------------------<  93  4.3   |  29  |  0.78    Ca    8.8      07 Jul 2017 00:19    TPro  7.7  /  Alb  3.9  /  TBili  0.7  /  DBili  x   /  AST  14  /  ALT  15  /  AlkPhos  106  07-07    proBNP:   Lipid Profile:   HgA1c:   TSH:

## 2017-07-08 NOTE — PROGRESS NOTE ADULT - ASSESSMENT
Patient is diagnosed with advanced dementia.  Patient's daughter came to take patient back to home, we  explained that this is not safe discharge and is not in the best interest of patient.  we will get  on consult on monday. Patient's daughter is agreed and understands the plan of care.

## 2017-07-08 NOTE — BEHAVIORAL HEALTH ASSESSMENT NOTE - NSBHCHARTREVIEWVS_PSY_A_CORE FT
Vital Signs Last 24 Hrs  T(C): 36.7 (08 Jul 2017 05:50), Max: 36.7 (08 Jul 2017 05:50)  T(F): 98.1 (08 Jul 2017 05:50), Max: 98.1 (08 Jul 2017 05:50)  HR: 73 (08 Jul 2017 15:42) (73 - 89)  BP: 127/67 (08 Jul 2017 15:42) (113/72 - 137/65)  BP(mean): --  RR: 17 (08 Jul 2017 15:42) (16 - 17)  SpO2: 100% (08 Jul 2017 15:42) (98% - 100%)

## 2017-07-08 NOTE — PROGRESS NOTE ADULT - ASSESSMENT
advanced dementia  daughter wanted to take her home  explained that this is not safe discharge.   on monday  daughter is agreable.  hematurea  sono renal and bladder.

## 2017-07-08 NOTE — BEHAVIORAL HEALTH ASSESSMENT NOTE - NSBHREFERDETAILS_PSY_A_CORE_FT
HPI:  Patient is an 82F with unknown PMH (as per outpatient med rec- unknown pharmacy- has been on several dementia medications) found by the police on the street wandering. As per ED attending, family had come to visit her, however upon admission, no family at bedside. Called spouse Casa and left message, no reply. Patient was seen and examined, AAOx1 only, does not know place, time or year. Denies any pain or discomfort but was frustrated and confused when speaking to . Primary team to follow up regarding patient's home medications for underlying dementia. (07 Jul 2017 03:17)

## 2017-07-08 NOTE — BEHAVIORAL HEALTH ASSESSMENT NOTE - NSBHCHARTREVIEWLAB_PSY_A_CORE FT
CBC Full  -  ( 2017 00:18 )  WBC Count : 8.8 K/uL  Hemoglobin : 12.7 g/dL  Hematocrit : 38.2 %  Platelet Count - Automated : 260 K/uL  Mean Cell Volume : 89.5 fl  Mean Cell Hemoglobin : 29.9 pg  Mean Cell Hemoglobin Concentration : 33.4 gm/dL  Auto Neutrophil # : x  Auto Lymphocyte # : x  Auto Monocyte # : x  Auto Eosinophil # : x  Auto Basophil # : x  Auto Neutrophil % : x  Auto Lymphocyte % : x  Auto Monocyte % : x  Auto Eosinophil % : x  Auto Basophil % : x  CBC Full  -  ( 2017 00:18 )  WBC Count : 8.8 K/uL  Hemoglobin : 12.7 g/dL  Hematocrit : 38.2 %  Platelet Count - Automated : 260 K/uL  Mean Cell Volume : 89.5 fl  Mean Cell Hemoglobin : 29.9 pg  Mean Cell Hemoglobin Concentration : 33.4 gm/dL  Auto Neutrophil # : x  Auto Lymphocyte # : x  Auto Monocyte # : x  Auto Eosinophil # : x  Auto Basophil # : x  Auto Neutrophil % : x  Auto Lymphocyte % : x  Auto Monocyte % : x  Auto Eosinophil % : x  Auto Basophil % : x  Urinalysis Basic - ( 2017 13:10 )    Color: Yellow / Appearance: Clear / S.025 / pH: x  Gluc: x / Ketone: Large  / Bili: Negative / Urobili: Negative   Blood: x / Protein: 15 / Nitrite: Negative   Leuk Esterase: Small / RBC: 0-2 /HPF / WBC 3-5 /HPF   Sq Epi: x / Non Sq Epi: Few / Bacteria: Trace /HPF

## 2017-07-08 NOTE — BEHAVIORAL HEALTH ASSESSMENT NOTE - SUMMARY
HPI:  Patient is an 82F with unknown PMH (as per outpatient med rec- unknown pharmacy- has been on several dementia medications) found by the police on the street wandering. As per ED attending, family had come to visit her, however upon admission, no family at bedside. Called spouse Casa and left message, no reply. Patient was seen and examined, AAOx1 only, does not know place, time or year. Denies any pain or discomfort but was frustrated and confused when speaking to . Primary team to follow up regarding patient's home medications for underlying dementia. (07 Jul 2017 03:17)  tried to see patient [id number] but was unable to as pt. was sedated from haldol 1mg, ativan 2 mg stat dose received earlier yesterday. as per report pt. was agitated and received haldol 1mg with poor response. pt. seen with morgan jalloh  who stated her mother always had difficulty focusing but her memory got worst over past 1 year and was seen by neuro who recommended namenda but pt. did not take it. pt. lives with her  and he is the sole care giver-makes food, help with shower, adl. pt. reportedly was not sleeping well at night and she wandered which led to her hospitalization. no inpt. psych admission. no substance abuse hx. pts mother had dementia. pt. is a poor historian due to dementia . pt. was uncooperative with exam. appears paranoid poor eye contact. unable to do further exam.

## 2017-07-08 NOTE — PROGRESS NOTE ADULT - PROBLEM SELECTOR PLAN 1
Patient was seen and examined by Psychiatry for advance dementia. They recommended  haloperidol: 1mg IM every 6 hr PRN,  Quetiapine 25mg BID. ( donot give when patient is sedated) Patient was seen and examined by Psychiatry for advance dementia. They recommended  haloperidol: 1mg IM every 6 hr PRN,  Quetiapine 25mg BID. ( donot give when patient is sedated)  Patient's treponema negative  urine tox negative

## 2017-07-08 NOTE — BEHAVIORAL HEALTH ASSESSMENT NOTE - NSBHCONSULTMEDS_PSY_A_CORE FT
start seroquel 25 mg bid-hold for sedation  prn haldol 0.5mg q 6 hr prn for agitation  social work consult -daughter want pt. to be d/c home with hha service  outpt. psych f/u  boxed warning dw daughter  safety issues dw daughter

## 2017-07-09 PROCEDURE — 76775 US EXAM ABDO BACK WALL LIM: CPT | Mod: 26

## 2017-07-09 RX ADMIN — HALOPERIDOL DECANOATE 1 MILLIGRAM(S): 100 INJECTION INTRAMUSCULAR at 12:33

## 2017-07-09 RX ADMIN — QUETIAPINE FUMARATE 25 MILLIGRAM(S): 200 TABLET, FILM COATED ORAL at 07:03

## 2017-07-09 RX ADMIN — HALOPERIDOL DECANOATE 1 MILLIGRAM(S): 100 INJECTION INTRAMUSCULAR at 01:38

## 2017-07-09 RX ADMIN — PREGABALIN 500 MICROGRAM(S): 225 CAPSULE ORAL at 12:33

## 2017-07-09 RX ADMIN — QUETIAPINE FUMARATE 25 MILLIGRAM(S): 200 TABLET, FILM COATED ORAL at 17:41

## 2017-07-09 NOTE — PROGRESS NOTE ADULT - ASSESSMENT
rpr neg.  advanced dementia  placement vs 24 observation at home 24 hours /day  on seroquel  and haldol prn

## 2017-07-09 NOTE — PROGRESS NOTE ADULT - SUBJECTIVE AND OBJECTIVE BOX
CHIEF COMPLAINT:Patient is a 82y old  Female who presents with a chief complaint of found wandering in the street (07 Jul 2017 03:17)  confused  advanced dementia  wonderer  comfortable on chair.  confused  	          REVIEW OF SYSTEMS:  CONSTITUTIONAL: No fever, weight loss, or fatigue  EYES: No eye pain, visual disturbances, or discharge  NECK: No pain or stiffness  RESPIRATORY: No cough, wheezing, chills or hemoptysis; No Shortness of Breath  CARDIOVASCULAR: No chest pain, palpitations, passing out, dizziness, or leg swelling  GASTROINTESTINAL: No abdominal or epigastric pain. No nausea, vomiting, or hematemesis; No diarrhea or constipation. No melena or hematochezia.  GENITOURINARY: No dysuria, frequency, hematuria, or incontinence  NEUROLOGICAL: No headaches, memory loss, loss of strength, numbness, or tremors  SKIN: No itching, burning, rashes, or lesions   LYMPH Nodes: No enlarged glands  ENDOCRINE: No heat or cold intolerance; No hair loss  MUSCULOSKELETAL: No joint pain or swelling; No muscle, back, or extremity pain    Medications:  MEDICATIONS  (STANDING):  cyanocobalamin 500 MICROGram(s) Oral daily  QUEtiapine 25 milliGRAM(s) Oral two times a day    MEDICATIONS  (PRN):  haloperidol    Injectable 1 milliGRAM(s) IntraMuscular every 6 hours PRN Agitation    	    PHYSICAL EXAM:  T(C): 36.3 (07-09-17 @ 14:45), Max: 36.8 (07-09-17 @ 05:16)  HR: 78 (07-09-17 @ 14:45) (72 - 78)  BP: 141/77 (07-09-17 @ 14:45) (141/77 - 151/67)  RR: 15 (07-09-17 @ 14:45) (15 - 16)  SpO2: 97% (07-09-17 @ 14:45) (97% - 97%)  Wt(kg): --  I&O's Summary      Appearance: Normal	  HEENT:   Normal oral mucosa, PERRL, EOMI	  Lymphatic: No lymphadenopathy  Cardiovascular: Normal S1 S2, No JVD, No murmurs, No edema  Respiratory: Lungs clear to auscultation	  Psychiatry: A & O x 3, Mood & affect appropriate  Gastrointestinal:  Soft, Non-tender, + BS	  Skin: No rashes, No ecchymoses, No cyanosis	  Neurologic: Non-focal  Extremities: Normal range of motion, No clubbing, cyanosis or edema  Vascular: Peripheral pulses palpable 2+ bilaterally    TELEMETRY: 	    ECG:  	  RADIOLOGY:  OTHER: 	  	  LABS:	 	    CARDIAC MARKERS:                  proBNP:   Lipid Profile:   HgA1c:   TSH:

## 2017-07-10 RX ADMIN — QUETIAPINE FUMARATE 25 MILLIGRAM(S): 200 TABLET, FILM COATED ORAL at 19:09

## 2017-07-10 RX ADMIN — HALOPERIDOL DECANOATE 1 MILLIGRAM(S): 100 INJECTION INTRAMUSCULAR at 02:41

## 2017-07-10 RX ADMIN — PREGABALIN 500 MICROGRAM(S): 225 CAPSULE ORAL at 18:08

## 2017-07-10 RX ADMIN — QUETIAPINE FUMARATE 25 MILLIGRAM(S): 200 TABLET, FILM COATED ORAL at 05:17

## 2017-07-10 NOTE — PROGRESS NOTE ADULT - SUBJECTIVE AND OBJECTIVE BOX
CHIEF COMPLAINT:Patient is a 82y old  Female who presents with a chief complaint of found wandering in the street (07 Jul 2017 03:17)  on chair comfortable.  not agitated  	          REVIEW OF SYSTEMS:  CONSTITUTIONAL: No fever, weight loss, or fatigue  EYES: No eye pain, visual disturbances, or discharge  NECK: No pain or stiffness  RESPIRATORY: No cough, wheezing, chills or hemoptysis; No Shortness of Breath  CARDIOVASCULAR: No chest pain, palpitations, passing out, dizziness, or leg swelling  GASTROINTESTINAL: No abdominal or epigastric pain. No nausea, vomiting, or hematemesis; No diarrhea or constipation. No melena or hematochezia.  GENITOURINARY: No dysuria, frequency, hematuria, or incontinence  NEUROLOGICAL: No headaches, memory loss, loss of strength, numbness, or tremors  SKIN: No itching, burning, rashes, or lesions   LYMPH Nodes: No enlarged glands  ENDOCRINE: No heat or cold intolerance; No hair loss  MUSCULOSKELETAL: No joint pain or swelling; No muscle, back, or extremity pain    Medications:  MEDICATIONS  (STANDING):  cyanocobalamin 500 MICROGram(s) Oral daily  QUEtiapine 25 milliGRAM(s) Oral two times a day    MEDICATIONS  (PRN):  haloperidol    Injectable 1 milliGRAM(s) IntraMuscular every 6 hours PRN Agitation    	    PHYSICAL EXAM:  T(C): 36.5 (07-10-17 @ 05:40), Max: 36.8 (07-09-17 @ 21:53)  HR: 87 (07-10-17 @ 05:40) (77 - 87)  BP: 148/63 (07-10-17 @ 05:40) (119/52 - 148/63)  RR: 18 (07-10-17 @ 05:40) (15 - 18)  SpO2: 99% (07-10-17 @ 05:40) (96% - 99%)  Wt(kg): --  I&O's Summary      Appearance: Normal	  HEENT:   Normal oral mucosa, PERRL, EOMI	  Lymphatic: No lymphadenopathy  Cardiovascular: Normal S1 S2, No JVD, No murmurs, No edema  Respiratory: Lungs clear to auscultation	  Psychiatry: A & O x 3, Mood & affect appropriate  Gastrointestinal:  Soft, Non-tender, + BS	  Skin: No rashes, No ecchymoses, No cyanosis	  Neurologic: Non-focal  Extremities: Normal range of motion, No clubbing, cyanosis or edema  Vascular: Peripheral pulses palpable 2+ bilaterally    TELEMETRY: 	    ECG:  	  RADIOLOGY:  OTHER: 	  	  LABS:	 	    CARDIAC MARKERS:                  proBNP:   Lipid Profile:   HgA1c:   TSH:

## 2017-07-10 NOTE — PROGRESS NOTE ADULT - ASSESSMENT
Patient is diagnosed with advanced dementia.  Patient's daughter came to take patient back to home, we  explained that this is not safe discharge and is not in the best interest of patient.  we will get  on consult on monday. Patient will get help from social workers to make it a safe discharge. Patient's daughter is agreed and understands the plan of care.

## 2017-07-10 NOTE — PROGRESS NOTE ADULT - ASSESSMENT
advanced dementia  on psych meds needs safe discharge advanced dementia  on psych meds needs safe discharge  micro hematurea  renal sono done nml  pelvic sono not done

## 2017-07-10 NOTE — PROGRESS NOTE ADULT - SUBJECTIVE AND OBJECTIVE BOX
PGY 1 Note discussed with supervising resident and primary attending    Patient is a 82y old  Female who presents with a chief complaint of found wandering in the street (07 Jul 2017 03:17)      INTERVAL HPI/OVERNIGHT EVENTS: offers no new complaints; current symptoms resolving. Patient will continue on seroquil and haloperidol. patient was more calm today.     MEDICATIONS  (STANDING):  cyanocobalamin 500 MICROGram(s) Oral daily  QUEtiapine 25 milliGRAM(s) Oral two times a day    MEDICATIONS  (PRN):  haloperidol    Injectable 1 milliGRAM(s) IntraMuscular every 6 hours PRN Agitation      __________________________________________________  REVIEW OF SYSTEMS:    CONSTITUTIONAL: No fever,   EYES: no acute visual disturbances  NECK: No pain or stiffness  RESPIRATORY: No cough; No shortness of breath  CARDIOVASCULAR: No chest pain, no palpitations  GASTROINTESTINAL: No pain. No nausea or vomiting; No diarrhea   NEUROLOGICAL: No headache or numbness, no tremors  MUSCULOSKELETAL: No joint pain, no muscle pain  GENITOURINARY: no dysuria, no frequency, no hesitancy  PSYCHIATRY: no depression , no anxiety  ALL OTHER  ROS negative        Vital Signs Last 24 Hrs  T(C): 36.6 (10 Jul 2017 14:31), Max: 36.8 (09 Jul 2017 21:53)  T(F): 97.9 (10 Jul 2017 14:31), Max: 98.3 (09 Jul 2017 21:53)  HR: 98 (10 Jul 2017 14:31) (77 - 98)  BP: 138/71 (10 Jul 2017 14:31) (119/52 - 148/63)  BP(mean): --  RR: 16 (10 Jul 2017 14:31) (16 - 18)  SpO2: 97% (10 Jul 2017 14:31) (96% - 99%)    ________________________________________________  PHYSICAL EXAM:  GENERAL: NAD  HEENT: Normocephalic;  conjunctivae and sclerae clear; moist mucous membranes;   NECK : supple  CHEST/LUNG: Clear to auscultation bilaterally with good air entry   HEART: S1 S2  regular; no murmurs, gallops or rubs  ABDOMEN: Soft, Nontender, Nondistended; Bowel sounds present  EXTREMITIES: no cyanosis; no edema; no calf tenderness  SKIN: warm and dry; no rash  NERVOUS SYSTEM:  Awake and alert; Oriented  to place, person and time ; no new deficits    _________________________________________________  LABS:              CAPILLARY BLOOD GLUCOSE            RADIOLOGY & ADDITIONAL TESTS:    Imaging Personally Reviewed:  YES/NO    Consultant(s) Notes Reviewed:   YES/ No    Care Discussed with Consultants :     Plan of care was discussed with patient and /or primary care giver; all questions and concerns were addressed and care was aligned with patient's wishes.

## 2017-07-10 NOTE — PROGRESS NOTE ADULT - PROBLEM SELECTOR PLAN 1
Patient was seen and examined by Psychiatry for advance dementia. They recommended  haloperidol: 1mg IM every 6 hr PRN,  Quetiapine 25mg BID. ( donot give when patient is sedated)  Patient's treponema negative  urine tox negative.  social workers are on the board to make it a safe discharge

## 2017-07-10 NOTE — PROGRESS NOTE ADULT - PROBLEM SELECTOR PLAN 3
Abdominal Ultrasound Abdominal Ultrasound    Right kidney: Normal size, measures 9.8 x 3.8 x 4.0 cm. No   hydronephrosis, shadowing calculus, or perinephric fluid collection.   There is a cyst in the lower pole measuring 4.2 x 3.8 x 4.3 cm.    Left kidney: Normal size, measures 9.8 x 4.9 x 4.1 cm. No hydronephrosis,   shadowing calculus, or perinephric fluid collection. There is a cyst in   the midpole measuring 2.8 x 1.9 cm.    Urinary bladder was not visualized.    Visualized proximal abdominal aorta measures 2.1 cm in AP dimension.   Visualized IVC is grossly unremarkable.

## 2017-07-11 ENCOUNTER — TRANSCRIPTION ENCOUNTER (OUTPATIENT)
Age: 82
End: 2017-07-11

## 2017-07-11 VITALS
RESPIRATION RATE: 16 BRPM | OXYGEN SATURATION: 99 % | HEART RATE: 87 BPM | DIASTOLIC BLOOD PRESSURE: 59 MMHG | SYSTOLIC BLOOD PRESSURE: 143 MMHG | TEMPERATURE: 98 F

## 2017-07-11 PROCEDURE — 82306 VITAMIN D 25 HYDROXY: CPT

## 2017-07-11 PROCEDURE — 82746 ASSAY OF FOLIC ACID SERUM: CPT

## 2017-07-11 PROCEDURE — 86780 TREPONEMA PALLIDUM: CPT

## 2017-07-11 PROCEDURE — 85027 COMPLETE CBC AUTOMATED: CPT

## 2017-07-11 PROCEDURE — 76775 US EXAM ABDO BACK WALL LIM: CPT

## 2017-07-11 PROCEDURE — 84443 ASSAY THYROID STIM HORMONE: CPT

## 2017-07-11 PROCEDURE — 83036 HEMOGLOBIN GLYCOSYLATED A1C: CPT

## 2017-07-11 PROCEDURE — 71045 X-RAY EXAM CHEST 1 VIEW: CPT

## 2017-07-11 PROCEDURE — 80307 DRUG TEST PRSMV CHEM ANLYZR: CPT

## 2017-07-11 PROCEDURE — 80053 COMPREHEN METABOLIC PANEL: CPT

## 2017-07-11 PROCEDURE — 81001 URINALYSIS AUTO W/SCOPE: CPT

## 2017-07-11 PROCEDURE — 82607 VITAMIN B-12: CPT

## 2017-07-11 PROCEDURE — 99285 EMERGENCY DEPT VISIT HI MDM: CPT | Mod: 25

## 2017-07-11 PROCEDURE — 70450 CT HEAD/BRAIN W/O DYE: CPT

## 2017-07-11 RX ORDER — QUETIAPINE FUMARATE 200 MG/1
1 TABLET, FILM COATED ORAL
Qty: 60 | Refills: 0 | OUTPATIENT
Start: 2017-07-11 | End: 2017-08-10

## 2017-07-11 RX ORDER — PREGABALIN 225 MG/1
1 CAPSULE ORAL
Qty: 30 | Refills: 0 | OUTPATIENT
Start: 2017-07-11 | End: 2017-08-10

## 2017-07-11 RX ADMIN — QUETIAPINE FUMARATE 25 MILLIGRAM(S): 200 TABLET, FILM COATED ORAL at 06:02

## 2017-07-11 RX ADMIN — PREGABALIN 500 MICROGRAM(S): 225 CAPSULE ORAL at 12:10

## 2017-07-11 RX ADMIN — HALOPERIDOL DECANOATE 1 MILLIGRAM(S): 100 INJECTION INTRAMUSCULAR at 00:00

## 2017-07-11 NOTE — PROGRESS NOTE ADULT - SUBJECTIVE AND OBJECTIVE BOX
CHIEF COMPLAINT:Patient is a 82y old  Female who presents with a chief complaint of found wandering in the street (07 Jul 2017 03:17)  on chair comfortable  not in any distress.    	          REVIEW OF SYSTEMS:  CONSTITUTIONAL: No fever, weight loss, or fatigue  EYES: No eye pain, visual disturbances, or discharge  NECK: No pain or stiffness  RESPIRATORY: No cough, wheezing, chills or hemoptysis; No Shortness of Breath  CARDIOVASCULAR: No chest pain, palpitations, passing out, dizziness, or leg swelling  GASTROINTESTINAL: No abdominal or epigastric pain. No nausea, vomiting, or hematemesis; No diarrhea or constipation. No melena or hematochezia.  GENITOURINARY: No dysuria, frequency, hematuria, or incontinence  NEUROLOGICAL: No headaches, memory loss, loss of strength, numbness, or tremors  SKIN: No itching, burning, rashes, or lesions   LYMPH Nodes: No enlarged glands  ENDOCRINE: No heat or cold intolerance; No hair loss  MUSCULOSKELETAL: No joint pain or swelling; No muscle, back, or extremity pain    Medications:  MEDICATIONS  (STANDING):  cyanocobalamin 500 MICROGram(s) Oral daily  QUEtiapine 25 milliGRAM(s) Oral two times a day  multivitamin 1 Tablet(s) Oral daily    MEDICATIONS  (PRN):  haloperidol    Injectable 1 milliGRAM(s) IntraMuscular every 6 hours PRN Agitation    	    PHYSICAL EXAM:  T(C): 36.7 (07-11-17 @ 05:28), Max: 36.7 (07-11-17 @ 05:28)  HR: 106 (07-11-17 @ 05:28) (97 - 106)  BP: 139/80 (07-11-17 @ 05:28) (138/71 - 167/76)  RR: 18 (07-11-17 @ 05:28) (16 - 18)  SpO2: 97% (07-11-17 @ 05:28) (97% - 98%)  Wt(kg): --  I&O's Summary      Appearance: Normal	  HEENT:   Normal oral mucosa, PERRL, EOMI	  Lymphatic: No lymphadenopathy  Cardiovascular: Normal S1 S2, No JVD, No murmurs, No edema  Respiratory: Lungs clear to auscultation	  Psychiatry: A & O x 3, Mood & affect appropriate  Gastrointestinal:  Soft, Non-tender, + BS	  Skin: No rashes, No ecchymoses, No cyanosis	  Neurologic: confused severe dementia  Extremities: Normal range of motion, No clubbing, cyanosis or edema  Vascular: Peripheral pulses palpable 2+ bilaterally    TELEMETRY: 	    ECG:  	  RADIOLOGY:  OTHER: 	  	  LABS:	 	    CARDIAC MARKERS:                  proBNP:   Lipid Profile:   HgA1c:   TSH:

## 2017-07-11 NOTE — DISCHARGE NOTE ADULT - CARE PLAN
Principal Discharge DX:	Dementia  Goal:	Patient is with dementia and is being discharged with quetapine (atypical antipsychotic) and is socially safe for discharge  Instructions for follow-up, activity and diet:	Patient is clear medically . Daughter and  said that they will take care of the patient and home and requested a home discharge.  Secondary Diagnosis:	Social problem

## 2017-07-11 NOTE — DISCHARGE NOTE ADULT - PLAN OF CARE
Patient is clear medically . Daughter and  said that they will take care of the patient and home and requested a home discharge. Patient is with dementia and is being discharged with quetapine (atypical antipsychotic) and is socially safe for discharge

## 2017-07-11 NOTE — DISCHARGE NOTE ADULT - MEDICATION SUMMARY - MEDICATIONS TO TAKE
I will START or STAY ON the medications listed below when I get home from the hospital:    QUEtiapine 25 mg oral tablet  -- 1 tab(s) by mouth 2 times a day  -- Indication: For Dementia and psychosis    Multiple Vitamins oral tablet  -- 1 tab(s) by mouth once a day  -- Indication: For Nutrition    cyanocobalamin 500 mcg oral tablet  -- 1 tab(s) by mouth once a day  -- Indication: For prevent anemia I will START or STAY ON the medications listed below when I get home from the hospital:    QUEtiapine 25 mg oral tablet  -- 1 tab(s) by mouth 2 times a day  -- Indication: For Depression    Multiple Vitamins oral tablet  -- 1 tab(s) by mouth once a day  -- Indication: For Nutrition    cyanocobalamin 500 mcg oral tablet  -- 1 tab(s) by mouth once a day  -- Indication: For prevent anemia

## 2017-07-11 NOTE — DISCHARGE NOTE ADULT - PATIENT PORTAL LINK FT
“You can access the FollowHealth Patient Portal, offered by [facility], by registering with the following website: [email or web address]”

## 2017-07-11 NOTE — DISCHARGE NOTE ADULT - HOSPITAL COURSE
Patient is an 82F with unknown PMH (as per outpatient med rec- unknown pharmacy- has been on several dementia medications) found by the police on the street wandering. As per ED attending, family had come to visit her, however upon admission, no family at bedside. . Patient was seen and examined, AAOx1 only, does not know place, time or year. Denies any pain or discomfort but was frustrated and confused when speaking to .   Pt presented with advanced dementia   psych conulted,They recommended haloperidol: 1mg IM every 6 hr PRN,  Quetiapine 25mg BID.     Patient's treponema negative  urine tox negative.  social workers are on the board to make it a safe discharge.    Hematuria.  Plan: U/S Bladder.     Problem/Plan - 3:  ·  Problem: Abdominal aortic aneurysm.  Plan: Abdominal Ultrasound Patient is an 82F with unknown PMH (as per outpatient med rec- unknown pharmacy- has been on several dementia medications) found by the police on the street wandering. As per ED attending, family had come to visit her, however upon admission, no family at bedside. . Patient was seen and examined, AAOx1 only, does not know place, time or year. Denies any pain or discomfort but was frustrated and confused when speaking to .   Pt presented with advanced dementia   psych conulted,They recommended haloperidol: 1mg IM every 6 hr PRN,  Quetiapine 25mg BID.     social workers are on the board to make it a safe discharge.      Pt also has Hematuria.  Plan: U/S Bladder is normal      Abdominal aortic aneurysm.  Plan: Abdominal Ultrasound is normal.    Patient is stable medically and cleared by Neuro (dr. Yang) and medically ( Dr. feliz ) for discharge.  Patient's family wants to take the patient home and is confident that they can take care of the family. Patient is an 82F with unknown PMH (as per outpatient med rec- unknown pharmacy- has been on several dementia medications) found by the police on the street wandering. As per ED attending, family had come to visit her, however upon admission, no family at bedside. . Patient was seen and examined, AAOx1 only, does not know place, time or year. Denies any pain or discomfort but was frustrated and confused when speaking to .   Pt presented with advanced dementia   psych consulted: Patient was diagnosed of having advanced dementia with behavioural disturbances. Inpatient haloperidol: 1mg IM every 6 hr PRN was given,  Patient is discharged on Quetiapine 25mg BID.     Detailed discussion with patients family was done (  and daughter). They said they can take care of her at home but would appreciate some home health aid.   was involved to make arrangements for the same.      Pt also has Hematuria.  Plan: U/S Bladder is normal      Abdominal aortic aneurysm.  Plan: Abdominal Ultrasound is normal.    Patient is stable medically and cleared by Neuro (dr. Yang) and medically ( Dr. feliz ) for discharge.  Patient's family wants to take the patient home and is confident that they can take care of the family.

## 2017-07-13 DIAGNOSIS — G30.1 ALZHEIMER'S DISEASE WITH LATE ONSET: ICD-10-CM

## 2017-07-13 DIAGNOSIS — F02.81 DEMENTIA IN OTHER DISEASES CLASSIFIED ELSEWHERE, UNSPECIFIED SEVERITY, WITH BEHAVIORAL DISTURBANCE: ICD-10-CM

## 2017-07-13 DIAGNOSIS — G93.40 ENCEPHALOPATHY, UNSPECIFIED: ICD-10-CM

## 2017-07-13 DIAGNOSIS — F05 DELIRIUM DUE TO KNOWN PHYSIOLOGICAL CONDITION: ICD-10-CM

## 2017-07-13 DIAGNOSIS — Z65.9 PROBLEM RELATED TO UNSPECIFIED PSYCHOSOCIAL CIRCUMSTANCES: ICD-10-CM

## 2017-07-13 DIAGNOSIS — I71.4 ABDOMINAL AORTIC ANEURYSM, WITHOUT RUPTURE: ICD-10-CM

## 2017-07-13 DIAGNOSIS — R31.9 HEMATURIA, UNSPECIFIED: ICD-10-CM

## 2017-07-13 DIAGNOSIS — Z91.83 WANDERING IN DISEASES CLASSIFIED ELSEWHERE: ICD-10-CM

## 2017-09-01 RX ORDER — QUETIAPINE FUMARATE 200 MG/1
1 TABLET, FILM COATED ORAL
Qty: 60 | Refills: 0 | OUTPATIENT
Start: 2017-09-01 | End: 2017-10-01

## 2017-10-20 ENCOUNTER — TRANSCRIPTION ENCOUNTER (OUTPATIENT)
Age: 82
End: 2017-10-20

## 2018-11-14 RX ORDER — CLONAZEPAM 1 MG
1 TABLET ORAL
Qty: 7 | Refills: 0
Start: 2018-11-14 | End: 2018-11-20

## 2018-11-14 RX ORDER — CLONAZEPAM 1 MG
1 TABLET ORAL
Qty: 7 | Refills: 0
Start: 2018-11-14 | End: 2020-06-04

## 2018-11-14 RX ORDER — CLONAZEPAM 1 MG
1 TABLET ORAL
Qty: 30 | Refills: 2
Start: 2018-11-14 | End: 2020-11-21

## 2020-05-12 PROBLEM — Z00.00 ENCOUNTER FOR PREVENTIVE HEALTH EXAMINATION: Status: ACTIVE | Noted: 2020-05-12

## 2022-03-08 NOTE — ED PROVIDER NOTE - NS_ATTENDINGSCRIBE_ED_ALL_ED
March 9, 2022      Erica Harding  [address] [place] [address]        Dear ,    We are writing to inform you of your test results.    Your lab results are normal.     If you have any questions please feel free to contact [phone] or myself via [employer].          Resulted Orders   Basic metabolic panel  (Ca, Cl, CO2, Creat, Gluc, K, Na, BUN)   Result Value Ref Range    Sodium 142 133 - 144 mmol/L    Potassium 3.5 3.4 - 5.3 mmol/L    Chloride 111 (H) 94 - 109 mmol/L    Carbon Dioxide (CO2) 24 20 - 32 mmol/L    Anion Gap 7 3 - 14 mmol/L    Urea Nitrogen 11 7 - 30 mg/dL    Creatinine 0.63 0.52 - 1.04 mg/dL    Calcium 9.0 8.5 - 10.1 mg/dL    Glucose 103 (H) 70 - 99 mg/dL    GFR Estimate >90 >60 mL/min/1.73m2      Comment:      Effective December 21, 2021 eGFRcr in adults is calculated using the 2021 CKD-EPI creatinine equation which includes age and gender (Misty et al., NEJ, DOI: 10.1056/COASmg2085662)   CBC with platelets   Result Value Ref Range    WBC Count 5.5 4.0 - 11.0 10e3/uL    RBC Count 4.30 3.80 - 5.20 10e6/uL    Hemoglobin 12.8 11.7 - 15.7 g/dL    Hematocrit 39.1 35.0 - 47.0 %    MCV 91 78 - 100 fL    MCH 29.8 26.5 - 33.0 pg    MCHC 32.7 31.5 - 36.5 g/dL    RDW 14.9 10.0 - 15.0 %    Platelet Count 224 150 - 450 10e3/uL       If you have any questions or concerns, please call the clinic at the number listed above.       Sincerely,      Karen Truong, NAN CNP/bailey           I personally performed the service described in the documentation recorded by the scribe in my presence, and it accurately and completely records my words and actions.

## 2023-09-01 NOTE — BEHAVIORAL HEALTH ASSESSMENT NOTE - NS ED [facility] DEMOGRAPHICS MEDICAL RECORD REVIEWED YES RECORDS
[FreeTextEntry1] : Mrs. 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 and limiting her functional capacity came for cardiac follow-up.      She takes  metolazone intermittently due to weight gain and edema of the legs, although clearly she admits that she does not get PND or orthopnea   January 25, 2022   echocardiogram done in [place] confirmed normal LVEF.  Presents today with continued significant AVALOS and palpitations.  Denies PND, orthopnea or CP.  Edema has improved with diuretic use.  She may have upcoming shoulder surgery.    EKG reviewed from several days ago.  NSR, PRP.   She has labile hypertension and she monitors her blood pressure before she takes any blood pressure medications.  Hospital chart